# Patient Record
Sex: FEMALE | Race: WHITE | NOT HISPANIC OR LATINO | Employment: OTHER | URBAN - METROPOLITAN AREA
[De-identification: names, ages, dates, MRNs, and addresses within clinical notes are randomized per-mention and may not be internally consistent; named-entity substitution may affect disease eponyms.]

---

## 2018-05-02 ENCOUNTER — OFFICE VISIT (OUTPATIENT)
Dept: FAMILY MEDICINE CLINIC | Facility: CLINIC | Age: 23
End: 2018-05-02

## 2018-05-02 VITALS
SYSTOLIC BLOOD PRESSURE: 118 MMHG | DIASTOLIC BLOOD PRESSURE: 76 MMHG | HEIGHT: 63 IN | BODY MASS INDEX: 46.49 KG/M2 | RESPIRATION RATE: 16 BRPM | HEART RATE: 88 BPM | WEIGHT: 262.4 LBS | OXYGEN SATURATION: 98 % | TEMPERATURE: 98.2 F

## 2018-05-02 DIAGNOSIS — F41.9 ANXIETY: Primary | ICD-10-CM

## 2018-05-02 PROCEDURE — 99203 OFFICE O/P NEW LOW 30 MIN: CPT | Performed by: PHYSICIAN ASSISTANT

## 2018-05-02 RX ORDER — CITALOPRAM 20 MG/1
20 TABLET ORAL DAILY
Qty: 30 TABLET | Refills: 11 | Status: SHIPPED | OUTPATIENT
Start: 2018-05-02

## 2018-05-02 NOTE — PROGRESS NOTES
Subjective   Kayla Askew is a 23 y.o. female  Establish Care and Depression (Treated with Celexa 20mg in the past with good results)      History of Present Illness  Patient is a 23-year-old white female who is a new patient comes in for anxiety and depression, states she's been a lot of stress she's recently had a breakup which is causing a lot of depression and anxiety she denies any SI/HI but states that she's under a lot of stress.  She has used Celexa in the past which has worked well she's interested in going back on this medication.      The following portions of the patient's history were reviewed and updated as appropriate: allergies, current medications, past social history and problem list    Review of Systems   Constitutional: Negative for appetite change, diaphoresis, fatigue and unexpected weight change.   Eyes: Negative for visual disturbance.   Respiratory: Negative for cough, chest tightness and shortness of breath.    Cardiovascular: Negative for chest pain, palpitations and leg swelling.   Gastrointestinal: Negative for diarrhea, nausea and vomiting.   Endocrine: Negative for polydipsia, polyphagia and polyuria.   Skin: Negative for color change and rash.   Neurological: Negative for dizziness, syncope, weakness, light-headedness, numbness and headaches.       Objective     Vitals:    05/02/18 1037   BP: 118/76   Pulse: 88   Resp: 16   Temp: 98.2 °F (36.8 °C)   SpO2: 98%       Physical Exam   Constitutional: She appears well-developed and well-nourished.   Neck: Neck supple. No JVD present. No thyromegaly present.   Cardiovascular: Normal rate, regular rhythm, normal heart sounds, intact distal pulses and normal pulses.    No murmur heard.  Pulmonary/Chest: Effort normal and breath sounds normal. No respiratory distress.   Abdominal: Soft. Bowel sounds are normal. There is no hepatosplenomegaly. There is no tenderness.   Musculoskeletal: She exhibits no edema.   Lymphadenopathy:     She has no  cervical adenopathy.   Neurological: No sensory deficit.   Skin: Skin is warm and dry. She is not diaphoretic.   Nursing note and vitals reviewed.      Assessment/Plan     Diagnoses and all orders for this visit:    Anxiety  -     citalopram (CELEXA) 20 MG tablet; Take 1 tablet by mouth Daily.    Follow up in 2 weeks to recheck

## 2018-05-23 ENCOUNTER — OFFICE VISIT (OUTPATIENT)
Dept: FAMILY MEDICINE CLINIC | Facility: CLINIC | Age: 23
End: 2018-05-23

## 2018-05-23 VITALS
SYSTOLIC BLOOD PRESSURE: 112 MMHG | HEIGHT: 63 IN | BODY MASS INDEX: 47.59 KG/M2 | DIASTOLIC BLOOD PRESSURE: 76 MMHG | OXYGEN SATURATION: 98 % | HEART RATE: 67 BPM | WEIGHT: 268.6 LBS | RESPIRATION RATE: 14 BRPM

## 2018-05-23 DIAGNOSIS — F32.1 MODERATE SINGLE CURRENT EPISODE OF MAJOR DEPRESSIVE DISORDER (HCC): Primary | ICD-10-CM

## 2018-05-23 PROCEDURE — 99213 OFFICE O/P EST LOW 20 MIN: CPT | Performed by: PHYSICIAN ASSISTANT

## 2018-05-23 NOTE — PROGRESS NOTES
Subjective   Kayla Askew is a 23 y.o. female  Depression (Started citalopram 20mg three weeks ago and is working well, however it is causing her to feel drowsy and then unable to fall asleep at night)      History of Present Illness  Patient is a 22-year-old white female comes in follow-up of depression and anxiety meds working well she states she's much improved no problems or complaints for a pleased with current treatment plan she states she has no SI or HI improved 70-80 percent  The following portions of the patient's history were reviewed and updated as appropriate: allergies, current medications, past social history and problem list    Review of Systems   Constitutional: Negative for appetite change and fatigue.   Respiratory: Negative for chest tightness and shortness of breath.    Gastrointestinal: Negative for abdominal pain, diarrhea and nausea.   Neurological: Negative for dizziness, tremors, weakness, light-headedness and headaches.   Psychiatric/Behavioral: Positive for dysphoric mood and sleep disturbance. Negative for agitation, behavioral problems, confusion, decreased concentration and suicidal ideas. The patient is nervous/anxious.        Objective     Vitals:    05/23/18 1013   BP: 112/76   Pulse: 67   Resp: 14   SpO2: 98%       Physical Exam   Constitutional: She appears well-developed and well-nourished.   Neck: Neck supple. No JVD present. No thyromegaly present.   Cardiovascular: Normal rate, regular rhythm, normal heart sounds, intact distal pulses and normal pulses.    No murmur heard.  Pulmonary/Chest: Effort normal and breath sounds normal. No respiratory distress.   Abdominal: Soft. Bowel sounds are normal. There is no hepatosplenomegaly. There is no tenderness.   Musculoskeletal: She exhibits no edema.   Lymphadenopathy:     She has no cervical adenopathy.   Neurological: No sensory deficit.   Skin: Skin is warm and dry. She is not diaphoretic.   Nursing note and vitals  reviewed.      Assessment/Plan     Diagnoses and all orders for this visit:    Moderate single current episode of major depressive disorder    #1 continue Celexa 20 mg everyday recheck in 3-6 months    Follow-up as needed

## 2018-06-06 ENCOUNTER — OFFICE VISIT (OUTPATIENT)
Dept: FAMILY MEDICINE CLINIC | Facility: CLINIC | Age: 23
End: 2018-06-06

## 2018-06-06 VITALS
RESPIRATION RATE: 14 BRPM | TEMPERATURE: 97.9 F | HEIGHT: 63 IN | HEART RATE: 102 BPM | DIASTOLIC BLOOD PRESSURE: 70 MMHG | SYSTOLIC BLOOD PRESSURE: 110 MMHG | OXYGEN SATURATION: 98 %

## 2018-06-06 DIAGNOSIS — J01.00 ACUTE NON-RECURRENT MAXILLARY SINUSITIS: Primary | ICD-10-CM

## 2018-06-06 PROCEDURE — 99213 OFFICE O/P EST LOW 20 MIN: CPT | Performed by: PHYSICIAN ASSISTANT

## 2018-06-06 RX ORDER — CEFDINIR 300 MG/1
300 CAPSULE ORAL 2 TIMES DAILY
Qty: 20 CAPSULE | Refills: 0 | Status: SHIPPED | OUTPATIENT
Start: 2018-06-06

## 2018-06-06 RX ORDER — PREDNISONE 20 MG/1
20 TABLET ORAL 2 TIMES DAILY
Qty: 14 TABLET | Refills: 0 | Status: SHIPPED | OUTPATIENT
Start: 2018-06-06

## 2018-06-06 NOTE — PROGRESS NOTES
Subjective   Kayla Askew is a 23 y.o. female  Nasal Congestion (PND, runny nose, sore throat, dry cough x1 week)      Sinusitis   This is a new problem. Her pain is at a severity of 8/10. The pain is moderate. Associated symptoms include congestion, coughing, ear pain, headaches, sinus pressure, a sore throat and swollen glands. Pertinent negatives include no chills or shortness of breath. The treatment provided moderate relief.       The following portions of the patient's history were reviewed and updated as appropriate: allergies, current medications, past social history and problem list    Review of Systems   Constitutional: Negative for chills, fatigue and fever.   HENT: Positive for congestion, ear pain, postnasal drip, rhinorrhea, sinus pressure and sore throat.    Eyes: Positive for pain.   Respiratory: Positive for cough. Negative for shortness of breath.    Neurological: Positive for headaches. Negative for dizziness.   Hematological: Negative for adenopathy.       Objective     Vitals:    06/06/18 1443   BP: 110/70   Pulse: 102   Resp: 14   Temp: 97.9 °F (36.6 °C)   SpO2: 98%       Physical Exam   Constitutional: She appears well-developed and well-nourished.   HENT:   Head: Normocephalic and atraumatic.   Right Ear: Tympanic membrane and ear canal normal.   Left Ear: Tympanic membrane and ear canal normal.   Nose: Mucosal edema, rhinorrhea and sinus tenderness present. Right sinus exhibits maxillary sinus tenderness and frontal sinus tenderness. Left sinus exhibits maxillary sinus tenderness and frontal sinus tenderness.   Mouth/Throat: Oropharynx is clear and moist. No oropharyngeal exudate.   Eyes: Pupils are equal, round, and reactive to light.   Cardiovascular: Normal rate and regular rhythm.    Pulmonary/Chest: Effort normal and breath sounds normal.   Nursing note and vitals reviewed.      Assessment/Plan     Diagnoses and all orders for this visit:    Acute non-recurrent maxillary sinusitis  -      cefdinir (OMNICEF) 300 MG capsule; Take 1 capsule by mouth 2 (Two) Times a Day.  -     predniSONE (DELTASONE) 20 MG tablet; Take 1 tablet by mouth 2 (Two) Times a Day.    follow as needed

## 2023-05-08 PROCEDURE — 87205 SMEAR GRAM STAIN: CPT | Performed by: NURSE PRACTITIONER

## 2023-05-08 PROCEDURE — 87070 CULTURE OTHR SPECIMN AEROBIC: CPT | Performed by: NURSE PRACTITIONER

## 2023-05-08 PROCEDURE — 87186 SC STD MICRODIL/AGAR DIL: CPT | Performed by: NURSE PRACTITIONER

## 2023-05-08 PROCEDURE — 87077 CULTURE AEROBIC IDENTIFY: CPT | Performed by: NURSE PRACTITIONER

## 2023-05-11 ENCOUNTER — TELEPHONE (OUTPATIENT)
Dept: URGENT CARE | Facility: CLINIC | Age: 28
End: 2023-05-11
Payer: COMMERCIAL

## 2023-05-11 DIAGNOSIS — A49.8 PSEUDOMONAS INFECTION: Primary | ICD-10-CM

## 2023-05-11 RX ORDER — CEFDINIR 300 MG/1
300 CAPSULE ORAL 2 TIMES DAILY
Qty: 14 CAPSULE | Refills: 0 | Status: SHIPPED | OUTPATIENT
Start: 2023-05-11

## 2023-05-11 NOTE — TELEPHONE ENCOUNTER
Patient called back, results were given. She did ask if she could start her new medication today since she took Bactrim, I advised her to ask pharmacist if that would be okay just to make sure she didn't interact with each other. [facility]

## 2023-05-11 NOTE — TELEPHONE ENCOUNTER
Patient presented to PCP with abscess of scalp. Wound culture positive for pseudomonas. RX Bactrim had been given at day of visit. C&S reviewed, patient will be instructed to stop Bactrim and begin cefdinir.   Results and plan of care will be reviewed with patient via clinical staff.

## 2023-07-25 ENCOUNTER — TELEPHONE (OUTPATIENT)
Dept: URGENT CARE | Facility: CLINIC | Age: 28
End: 2023-07-25
Payer: COMMERCIAL

## 2024-02-27 ENCOUNTER — OFFICE VISIT (OUTPATIENT)
Dept: INTERNAL MEDICINE | Facility: CLINIC | Age: 29
End: 2024-02-27
Payer: COMMERCIAL

## 2024-02-27 VITALS
HEART RATE: 72 BPM | SYSTOLIC BLOOD PRESSURE: 100 MMHG | WEIGHT: 177 LBS | DIASTOLIC BLOOD PRESSURE: 70 MMHG | BODY MASS INDEX: 30.22 KG/M2 | RESPIRATION RATE: 18 BRPM | TEMPERATURE: 97.3 F | HEIGHT: 64 IN

## 2024-02-27 DIAGNOSIS — M25.572 ACUTE LEFT ANKLE PAIN: ICD-10-CM

## 2024-02-27 DIAGNOSIS — S92.255A CLOSED NONDISPLACED FRACTURE OF NAVICULAR BONE OF LEFT FOOT, INITIAL ENCOUNTER: Primary | ICD-10-CM

## 2024-02-27 DIAGNOSIS — S99.912S LEFT ANKLE INJURY, SEQUELA: ICD-10-CM

## 2024-02-27 DIAGNOSIS — J45.901 EXACERBATION OF ASTHMA, UNSPECIFIED ASTHMA SEVERITY, UNSPECIFIED WHETHER PERSISTENT: ICD-10-CM

## 2024-02-27 PROBLEM — J45.20 MILD INTERMITTENT ASTHMA WITHOUT COMPLICATION: Status: ACTIVE | Noted: 2024-02-27

## 2024-02-27 PROBLEM — E66.9 OBESITY (BMI 30.0-34.9): Status: ACTIVE | Noted: 2024-02-27

## 2024-02-27 PROBLEM — E66.811 OBESITY (BMI 30.0-34.9): Status: ACTIVE | Noted: 2024-02-27

## 2024-02-27 RX ORDER — ALBUTEROL SULFATE 90 UG/1
2 AEROSOL, METERED RESPIRATORY (INHALATION) EVERY 4 HOURS PRN
Qty: 18 G | Refills: 0 | Status: SHIPPED | OUTPATIENT
Start: 2024-02-27

## 2024-02-27 NOTE — PATIENT INSTRUCTIONS
MyPlate from USDA    MyPlate is an outline of a general healthy diet based on the Dietary Guidelines for Americans, 6927-4313, from the U.S. Department of Agriculture (USDA). It sets guidelines for how much food you should eat from each food group based on your age, sex, and level of physical activity.  What are tips for following MyPlate?  To follow MyPlate recommendations:  Eat a wide variety of fruits and vegetables, grains, and protein foods.  Serve smaller portions and eat less food throughout the day.  Limit portion sizes to avoid overeating.  Enjoy your food.  Get at least 150 minutes of exercise every week. This is about 30 minutes each day, 5 or more days per week.  It can be difficult to have every meal look like MyPlate. Think about MyPlate as eating guidelines for an entire day, rather than each individual meal.  Fruits and vegetables  Make one half of your plate fruits and vegetables.  Eat many different colors of fruits and vegetables each day.  For a 2,000-calorie daily food plan, eat:  2½ cups of vegetables every day.  2 cups of fruit every day.  1 cup is equal to:  1 cup raw or cooked vegetables.  1 cup raw fruit.  1 medium-sized orange, apple, or banana.  1 cup 100% fruit or vegetable juice.  2 cups raw leafy greens, such as lettuce, spinach, or kale.  ½ cup dried fruit.  Grains  One fourth of your plate should be grains.  Make at least half of the grains you eat each day whole grains.  For a 2,000-calorie daily food plan, eat 6 oz of grains every day.  1 oz is equal to:  1 slice bread.  1 cup cereal.  ½ cup cooked rice, cereal, or pasta.  Protein  One fourth of your plate should be protein.  Eat a wide variety of protein foods, including meat, poultry, fish, eggs, beans, nuts, and tofu.  For a 2,000-calorie daily food plan, eat 5½ oz of protein every day.  1 oz is equal to:  1 oz meat, poultry, or fish.  ¼ cup cooked beans.  1 egg.  ½ oz nuts or seeds.  1 Tbsp peanut butter.  Dairy  Drink fat-free  or low-fat (1%) milk.  Eat or drink dairy as a side to meals.  For a 2,000-calorie daily food plan, eat or drink 3 cups of dairy every day.  1 cup is equal to:  1 cup milk, yogurt, cottage cheese, or soy milk (soy beverage).  2 oz processed cheese.  1½ oz natural cheese.  Fats, oils, salt, and sugars  Only small amounts of oils are recommended.  Avoid foods that are high in calories and low in nutritional value (empty calories), like foods high in fat or added sugars.  Choose foods that are low in salt (sodium). Choose foods that have less than 140 milligrams (mg) of sodium per serving.  Drink water instead of sugary drinks. Drink enough fluid to keep your urine pale yellow.  Where to find support  Work with your health care provider or a dietitian to develop a customized eating plan that is right for you.  Download an verito (mobile application) to help you track your daily food intake.  Where to find more information  USDA: ChooseMyPlate.gov  Summary  MyPlate is a general guideline for healthy eating from the USDA. It is based on the Dietary Guidelines for Americans, 1914-7025.  In general, fruits and vegetables should take up one half of your plate, grains should take up one fourth of your plate, and protein should take up one fourth of your plate.  This information is not intended to replace advice given to you by your health care provider. Make sure you discuss any questions you have with your health care provider.  Document Revised: 11/08/2021 Document Reviewed: 11/08/2021  [employer] Patient Education © 2023 Else[employer] Inc.

## 2024-02-28 ENCOUNTER — PATIENT ROUNDING (BHMG ONLY) (OUTPATIENT)
Dept: INTERNAL MEDICINE | Facility: CLINIC | Age: 29
End: 2024-02-28
Payer: COMMERCIAL

## 2024-02-28 NOTE — PROGRESS NOTES
A Miso message has been sent to the patient for patient rounding with [facility].

## 2024-03-17 ENCOUNTER — HOSPITAL ENCOUNTER (OUTPATIENT)
Dept: MRI IMAGING | Facility: HOSPITAL | Age: 29
Discharge: HOME | End: 2024-03-17
Admitting: PHYSICIAN ASSISTANT
Payer: COMMERCIAL

## 2024-03-17 DIAGNOSIS — M25.572 ACUTE LEFT ANKLE PAIN: ICD-10-CM

## 2024-03-17 DIAGNOSIS — S99.912S LEFT ANKLE INJURY, SEQUELA: ICD-10-CM

## 2024-03-17 DIAGNOSIS — S92.255A CLOSED NONDISPLACED FRACTURE OF NAVICULAR BONE OF LEFT FOOT, INITIAL ENCOUNTER: ICD-10-CM

## 2024-03-17 PROCEDURE — 73721 MRI JNT OF LWR EXTRE W/O DYE: CPT

## 2024-03-19 ENCOUNTER — TELEPHONE (OUTPATIENT)
Dept: INTERNAL MEDICINE | Facility: CLINIC | Age: 29
End: 2024-03-19
Payer: COMMERCIAL

## 2024-03-19 NOTE — TELEPHONE ENCOUNTER
Tried to reach patient no answer left voicemail to return call    RELAY:  Please ask patient if she is currently seeing an orthopedic provider, I know there were a lot of issues with previous   There are no new fractures on MRI, there is evidence of old fracture and stress related changes as well as tendonitiis

## 2024-03-19 NOTE — TELEPHONE ENCOUNTER
----- Message from Kayla Mena PA-C sent at 3/19/2024 10:28 AM EDT -----  Please ask patient if she is currently seeing an orthopedic provider, I know there were a lot of issues with previous  There are no new fractures on MRI, there is evidence of old fracture and stress related changes as well as tendonitiis

## 2024-03-20 NOTE — TELEPHONE ENCOUNTER
2nd attempt    Tried to reach patient no answer left voicemail to return call     RELAY:  Please ask patient if she is currently seeing an orthopedic provider, I know there were a lot of issues with previous   There are no new fractures on MRI, there is evidence of old fracture and stress related changes as well as tendonitiis

## 2024-03-21 NOTE — TELEPHONE ENCOUNTER
Name: Kayla Askew    Relationship: Self    Best Callback Number: [phone]     HUB PROVIDED THE RELAY MESSAGE FROM THE OFFICE   PATIENT HAS FURTHER QUESTIONS AND WOULD LIKE A CALL BACK AT THE FOLLOWING PHONE NUMBER [phone]    ADDITIONAL INFORMATION: PATIENT WOULD LIKE TO RESCHEDULE HER APPOINTMENT TO A SOONEST AVAILABLE. ALSO STATES SHE DID SEE AN ORTHOPEDIC PROVIDER WHEN SHE FIRST INJURED HERSELF. THEY RELEASED HER BACK TO WORK STATING SHE WAS JUST SEVERELY OVER WEIGHT.     ANOTHER  PROVIDER RELEASED HER BACK TO WORK WITH REDUCED RESTRICTIONS STATING TO REST FOR 30 MINUTES FOR EVERY HOUR WORKING BUT HER JOB COULD NOT ACCEPT THOSE RESTRICTIONS. PATIENT HAS BEEN OFF WORK SINCE DECEMBER WHEN THE INJURY FIRST HAPPENED.   RETURNED TO WORK FOR ONE WEEK AND THEN SAW THE SECOND ORTHO PROVIDER AND HAS BEEN OFF SINCE.   PATIENT IS IN A LOT OF PAIN EVEN THOUGH SHE IS NOT USING IT OVER THE PAST FEW DAYS. WHEN MOVING WITH EVERY MOVEMENT IT FEELS LIKE HER ANKLE IS BREAKING.    WANTS TO NOTE THAT SHE DID HAVE A FOLLOW UP APPOINTMENT WITH DR NAVARRO WHOM SHE HAD THE BAD EXPERIENCE WITH) BUT GOT SICK AND HAD TO RESCHEDULE- STILL WAITING FOR THEM TO CALL TO RESCHEDULE    PLEASE CALL TO ADVISE IF ABLE TO WORK IN SOONER.

## 2024-03-22 ENCOUNTER — TELEPHONE (OUTPATIENT)
Dept: INTERNAL MEDICINE | Facility: CLINIC | Age: 29
End: 2024-03-22
Payer: COMMERCIAL

## 2024-03-22 DIAGNOSIS — S92.255A CLOSED NONDISPLACED FRACTURE OF NAVICULAR BONE OF LEFT FOOT, INITIAL ENCOUNTER: Primary | ICD-10-CM

## 2024-03-22 NOTE — TELEPHONE ENCOUNTER
Name: Kayla Askew    Relationship: Self    Best Callback Number: [phone]    HUB PROVIDED THE RELAY MESSAGE FROM THE OFFICE   PATIENT VOICED UNDERSTANDING AND HAS NO FURTHER QUESTIONS AT THIS TIME    ADDITIONAL INFORMATION: IS REQUESTING A REFERRAL FOR A ORTHO

## 2024-03-22 NOTE — TELEPHONE ENCOUNTER
There are two messages regarding this.    Patient has called the clinic back and relayed to the hub that she would like a referral. Kayla has sent one in as requested.     I have left a detailed message with return phone number advising patient that a referral has been sent in as requested.     Relay:  Your referral has been sent, thank you

## 2024-03-22 NOTE — TELEPHONE ENCOUNTER
I have called and left a detailed message with return phone number    Relay:  Is she ok with me sending a referral to ortho for her  or does she want to stay with the ortho she has

## 2024-03-22 NOTE — TELEPHONE ENCOUNTER
Is she ok with me sending a referral to ortho for her  or does she want to stay with the ortho she has

## 2024-03-22 NOTE — TELEPHONE ENCOUNTER
Duplicate message.     I have left a detailed message with return phone number advising that her referral has been sent as requested.     Relay:  Referral has been sent, thank you

## 2024-03-27 ENCOUNTER — TELEPHONE (OUTPATIENT)
Dept: INTERNAL MEDICINE | Facility: CLINIC | Age: 29
End: 2024-03-27
Payer: COMMERCIAL

## 2024-03-27 NOTE — TELEPHONE ENCOUNTER
Name: JamilahKayla luna    Relationship: Self    Best Callback Number: [phone]    HUB PROVIDED THE RELAY MESSAGE FROM THE OFFICE   PATIENT VOICED UNDERSTANDING AND HAS NO FURTHER QUESTIONS AT THIS TIME    ADDITIONAL INFORMATION:

## 2024-03-27 NOTE — TELEPHONE ENCOUNTER
I have called and left a message with return phone number      Relay:  I have checked with the clinic supervisor, Vaishali, and the patient would need to have the FMLA/ work restriction paperwork filled out by ortho or whoever took her off of work due to restrictions. She can let the 2nd office that she has seen know that it is a HIPAA violation to block her continuation of care by not providing office notes to the new provider. She can request for whoever fills out her paperwork to take her off of work until she has seen the new ortho provider as well. Please let me know if she has any additional questions.

## 2024-04-02 ENCOUNTER — OFFICE VISIT (OUTPATIENT)
Dept: INTERNAL MEDICINE | Facility: CLINIC | Age: 29
End: 2024-04-02
Payer: COMMERCIAL

## 2024-04-02 ENCOUNTER — TELEPHONE (OUTPATIENT)
Dept: INTERNAL MEDICINE | Facility: CLINIC | Age: 29
End: 2024-04-02

## 2024-04-02 VITALS
TEMPERATURE: 97.7 F | WEIGHT: 189.13 LBS | BODY MASS INDEX: 32.29 KG/M2 | RESPIRATION RATE: 16 BRPM | HEIGHT: 64 IN | SYSTOLIC BLOOD PRESSURE: 108 MMHG | DIASTOLIC BLOOD PRESSURE: 72 MMHG | HEART RATE: 60 BPM

## 2024-04-02 DIAGNOSIS — M65.9 TENOSYNOVITIS OF ANKLE: ICD-10-CM

## 2024-04-02 DIAGNOSIS — Z00.00 ANNUAL PHYSICAL EXAM: Primary | ICD-10-CM

## 2024-04-02 PROBLEM — M65.979 TENOSYNOVITIS OF ANKLE: Status: ACTIVE | Noted: 2024-04-02

## 2024-04-02 RX ORDER — PREDNISONE 10 MG/1
TABLET ORAL
Qty: 9 TABLET | Refills: 0 | Status: SHIPPED | OUTPATIENT
Start: 2024-04-02 | End: 2024-04-08

## 2024-04-02 NOTE — TELEPHONE ENCOUNTER
[facility] called and states they mailed patients requested medical records on 3/6/2024. She states they continue to get fax request for patient.

## 2024-04-02 NOTE — PROGRESS NOTES
Office Note     Name: Kayla Askew    : 1995     MRN: [id number]     Chief Complaint  Annual Exam    Subjective     History of Present Illness:  Kayla Askew is a 29 y.o. female who presents today for annual physical.    Patient is still experiencing left ankle pain. Patient reports new ortho will not accept appointment until old records are faxed. Will sign KINA today    From 2024:  Patient reports having a diffuse left ankle pain which started a couple of weeks before [place]. It is not localized medially or laterally. She reports that she was originally seen by [facility]. At that time, she was told that she has a navicular fracture. She was then seen by [facility] and was informed to have a navicular fracture and was placed in a walking boot. She had an unpleasant experience with this practice. She saw [facility] most recently on 2024. They have her in an ankle brace, however, that seems to be making the pain worse. She is using Motrin as needed without any relief.      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 Associated mild productive changes are noted. These findings may account for evidence of apparent fracture on   prior radiographs.  2.Otherwise there is no evidence for fracture or malalignment. No osseous contusion is seen.  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  4.Mild changes of tendinopathy and tenosynovitis involving the peroneus longus and brevis tendons.    Review of Systems:   Review of Systems    Past Medical History:   Past Medical History:   Diagnosis Date    Asthma     Depression     Low back pain     Menses painful        Past Surgical History: History reviewed. No pertinent surgical  "history.    Immunizations:   Immunization History   Administered Date(s) Administered    DTaP, Unspecified 05/18/2000    IPV 05/18/2000    MMR 05/18/2000        Medications:     Current Outpatient Medications:     albuterol sulfate  (90 Base) MCG/ACT inhaler, Inhale 2 puffs Every 4 (Four) Hours As Needed for Wheezing or Shortness of Air., Disp: 18 g, Rfl: 0    predniSONE (DELTASONE) 10 MG tablet, Take 1 tablet by mouth 2 (Two) Times a Day for 3 days, THEN 1 tablet Daily for 3 days., Disp: 9 tablet, Rfl: 0    Allergies:   No Known Allergies    Family History:   Family History   Problem Relation Age of Onset    Thyroid disease Mother     Migraines Mother     Hypertension Father     Hyperlipidemia Father     Diabetes Father     Arthritis Maternal Aunt     Arthritis Paternal Aunt     Arthritis Maternal Grandmother     Arthritis Maternal Grandfather     Arthritis Paternal Grandmother     Arthritis Paternal Grandfather        Social History:   Social History     Socioeconomic History    Marital status: Single   Tobacco Use    Smoking status: Never     Passive exposure: Never    Smokeless tobacco: Never   Vaping Use    Vaping status: Never Used   Substance and Sexual Activity    Alcohol use: Yes     Alcohol/week: 8.0 standard drinks of alcohol     Types: 3 Shots of liquor, 5 Drinks containing 0.5 oz of alcohol per week     Comment: Socially    Drug use: No    Sexual activity: Yes     Partners: Male         Objective     Vital Signs  /72 (BP Location: Left arm, Patient Position: Sitting, Cuff Size: Adult)   Pulse 60   Temp 97.7 °F (36.5 °C) (Temporal)   Resp 16   Ht 161.5 cm (63.58\")   Wt 85.8 kg (189 lb 2 oz)   BMI 32.89 kg/m²   Estimated body mass index is 32.89 kg/m² as calculated from the following:    Height as of this encounter: 161.5 cm (63.58\").    Weight as of this encounter: 85.8 kg (189 lb 2 oz).            Physical Exam  Vitals and nursing note reviewed.   Constitutional:       Appearance: " Normal appearance.   Cardiovascular:      Rate and Rhythm: Normal rate and regular rhythm.      Pulses: Normal pulses.      Heart sounds: Normal heart sounds.   Pulmonary:      Effort: Pulmonary effort is normal.      Breath sounds: Normal breath sounds.   Genitourinary:     Comments: Deferred patient currently menstruating  Musculoskeletal:      Right ankle: Normal.      Left ankle: No swelling, deformity or ecchymosis. Tenderness present over the ATF ligament. Decreased range of motion.   Skin:     General: Skin is warm and dry.   Neurological:      General: No focal deficit present.      Mental Status: She is alert.   Psychiatric:         Mood and Affect: Mood normal.         Behavior: Behavior normal.          Assessment and Plan     1. Annual physical exam    - Comprehensive Metabolic Panel; Future  - CBC & Differential; Future  - Lipid Panel; Future  - TSH Rfx On Abnormal To Free T4; Future    2. Tenosynovitis of ankle    - predniSONE (DELTASONE) 10 MG tablet; Take 1 tablet by mouth 2 (Two) Times a Day for 3 days, THEN 1 tablet Daily for 3 days.  Dispense: 9 tablet; Refill: 0     Reviewed medications, potential side effects and signs and symptoms to report. Discussed risk versus benefits of treatment plan with patient and/or family-including medications, labs and radiology that may be ordered. Addressed questions and concerns during visit. Patient and/or family verbalized understanding and agree with plan. Instructed to call the office with any questions and report to ER with any life-threatening symptoms.       Patient does use a seatbelt, use sunscreen, helmet when necessary, smoke detectors and carbon monoxide detectors are in home, safe sexual practices and does not participate in illicit drug use.    Recommended annual Dental, Eye and Dermatology exam.  Explained to patient no referral should be required however I am happy to help if one is needed      Follow Up  Return in about 4 weeks (around 4/30/2024)  jordi Monreal.    WILFREDO Quintana [facility]  [address] [address] [phone]

## 2024-04-03 ENCOUNTER — TELEPHONE (OUTPATIENT)
Dept: INTERNAL MEDICINE | Facility: CLINIC | Age: 29
End: 2024-04-03
Payer: COMMERCIAL

## 2024-04-03 NOTE — TELEPHONE ENCOUNTER
Patient's mother dropped off forms that need to be completed. I told patient's mom that patient would need to pay a 25 form fee before forms could be released. Forms placed in [employer] box up front.

## 2024-04-04 NOTE — TELEPHONE ENCOUNTER
Kayla states that she was able to get an appointment tomorrow with ortho and is wanting them to assess and fill out her paperwork at this time. She will contact the clinic if anything changes.     Forms are placed in the front office for

## 2024-04-04 NOTE — TELEPHONE ENCOUNTER
I have called and left a message with return phone number to complete patient's work restriction form. Please transfer call to Dorie to complete form. Form is on Dorie's desk until completed.

## 2024-04-05 ENCOUNTER — OFFICE VISIT (OUTPATIENT)
Dept: ORTHOPEDIC SURGERY | Facility: CLINIC | Age: 29
End: 2024-04-05
Payer: COMMERCIAL

## 2024-04-05 ENCOUNTER — TELEPHONE (OUTPATIENT)
Dept: ORTHOPEDIC SURGERY | Facility: CLINIC | Age: 29
End: 2024-04-05

## 2024-04-05 VITALS
WEIGHT: 180.4 LBS | DIASTOLIC BLOOD PRESSURE: 72 MMHG | HEIGHT: 64 IN | BODY MASS INDEX: 30.8 KG/M2 | SYSTOLIC BLOOD PRESSURE: 110 MMHG

## 2024-04-05 DIAGNOSIS — M79.672 LEFT FOOT PAIN: Primary | ICD-10-CM

## 2024-04-05 RX ORDER — MELOXICAM 15 MG/1
TABLET ORAL
Qty: 30 TABLET | Refills: 1 | Status: SHIPPED | OUTPATIENT
Start: 2024-04-05

## 2024-04-05 NOTE — TELEPHONE ENCOUNTER
Stormy ok to read:   Called patient to schedule f/u with Dr. Espinoza. Also let her know that we sent her work note to her mychart.

## 2024-04-05 NOTE — PROGRESS NOTES
[facility] Orthopaedic Surgery Office Follow Up       Office Follow Up Visit       Patient Name: Kayla Askew    Chief Complaint:   Chief Complaint   Patient presents with    Left Foot - Pain     DOI: 12/16/23       Referring Physician: Kayla Mena P*    History of Present Illness:   Kayla Askew returns to clinic today for new problem of left foot pain.  Ongoing since 12/16/2023.  She does not recall significant injury or trauma.  She felt she may have stepped awkwardly at onset of pain.  Rates pain 8/10.  Described as aching, stabbing, shooting pain.  Associated with popping.    She went to [facility] urgent treatment center on 12/21/2023 for evaluation due to the pain.  She was told she had a fracture.  She was encouraged to wear a boot for 6 weeks.  She then saw Dr. Gonzalez at [facility] orthopedics on 1/5/2024 for evaluation.  He said it appeared the fracture was healing and to start weaning out of the boot.  Recommended returning to work once comfortable.    She says she returned to work in February however the pain quickly worsened.  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  She has been off work since then due to the pain.    Her primary care provider ordered an MRI of the left foot which was obtained on 3/17/2024.  Here for further evaluation.    History of plantar fasciitis.    Subjective     Review of Systems   Constitutional: Negative.  Negative for chills, fatigue and fever.   HENT: Negative.  Negative for congestion and dental problem.    Eyes: Negative.  Negative for blurred vision.   Respiratory: Negative.  Negative for shortness of breath.    Cardiovascular: Negative.  Negative for leg swelling.   Gastrointestinal: Negative.  Negative for abdominal pain.   Endocrine: Negative.  Negative for polyuria.   Genitourinary: Negative.  Negative for difficulty urinating.   Musculoskeletal:  Positive for  "arthralgias.   Skin: Negative.    Allergic/Immunologic: Negative.    Neurological: Negative.    Hematological: Negative.  Negative for adenopathy.   Psychiatric/Behavioral: Negative.  Negative for behavioral problems.         I have reviewed and updated the following portions of the patient's history and review of systems: allergies, current medications, past family history, past medical history, past social history, past surgical history and problem list.    Medications:   Current Outpatient Medications:     albuterol sulfate  (90 Base) MCG/ACT inhaler, Inhale 2 puffs Every 4 (Four) Hours As Needed for Wheezing or Shortness of Air., Disp: 18 g, Rfl: 0    predniSONE (DELTASONE) 10 MG tablet, Take 1 tablet by mouth 2 (Two) Times a Day for 3 days, THEN 1 tablet Daily for 3 days., Disp: 9 tablet, Rfl: 0    meloxicam (MOBIC) 15 MG tablet, 1 PO Daily with food.  Stop if you have upset stomach/stomach irritation., Disp: 30 tablet, Rfl: 1    Allergies: No Known Allergies      Objective      Vital Signs:   Vitals:    04/05/24 0852   BP: 110/72   Weight: 81.8 kg (180 lb 6.4 oz)   Height: 161.3 cm (63.5\")       Ortho Exam:  Peripheral Vascular:  Lower Extremity:  Inspection:  Left--rapid capillary refill  Palpation:  Dorsalis pedis pulse:  Left--normal    Neurologic  Sensory:    Light Touch:     Left foot:  Dorsal intact and plantar intact    Overall Assessment of Muscle Strength and Tone:  Lower Extremities:       Left:  Tibialis anterior--5/5    Gastroc soleus--5/5    EHL--5/5    FHL--5/5    Musculoskeletal  Lower Extremity  Ankle/Foot:  Inspection and Palpation:        Left:  Tenderness:midfoot around navicular bone    Swelling:None    Effusion:  None    Crepitus:  None     ROM:     Left: Plantarflexion--50    Dorsiflexion--20    Inversion--10    Eversion--10    Results Review:  XR Foot 3+ View Left  Left Foot X-Ray 04/05/24   Indication: Pain  Views: 3 weight bearing , comparison none  Findings: xrays reviewed by " me today in the office and show no acute   osseous abnormality, normal alignment, there appears to be large   osteophyte at the dorsal navicular bone soft tissue swelling in the foot   visible on lateral view       MRI Ankle Left Without Contrast    Result Date: 3/19/2024  1.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 Associated mild productive changes are noted. These findings may account for evidence of apparent fracture on prior radiographs. 2.Otherwise there is no evidence for fracture or malalignment. No osseous contusion is seen. 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 4.Mild changes of tendinopathy and tenosynovitis involving the peroneus longus and brevis tendons. Electronically Signed: Chris Small MD  3/19/2024 10:17 AM EDT  Workstation ID: [id number]        Assessment / Plan      Assessment:   Diagnoses and all orders for this visit:    1. Left foot pain (Primary)  -     XR Foot 3+ View Left  -     meloxicam (MOBIC) 15 MG tablet; 1 PO Daily with food.  Stop if you have upset stomach/stomach irritation.  Dispense: 30 tablet; Refill: 1        Quality Metrics:   BMI:   BMI is >= 30 and <35. (Class 1 Obesity). The following options were offered after discussion;: weight loss educational material (shared in after visit summary)       Tobacco:   Kayla Askew  reports that she has never smoked. She has never been exposed to tobacco smoke. She has never used smokeless tobacco.        Plan:  X-ray images left foot reviewed today with Dr. Espinoza.  No acute bony changes.  Large osteophyte present at dorsal aspect of navicular bone.  MRI images left foot personally reviewed and interpreted.  Large osteophyte off navicular bone present which may represent accessory ossification rather than apparent fracture  identified on previous x-rays.  Appears to have edema surrounding.  Encouraged an anti-inflammatory to take for the pain and swelling.  Prescription sent in for meloxicam.  Recommend follow-up with Dr. Espinoza for corticosteroid injection.  Recommend work restrictions for the next 2 weeks to include 1 hour of sitting for every 4 hours worked. This will allow steroid injection and anti-inflammatory to start working prior to returning full-duty.      Follow Up:   Follow-up with Dr. Olga Stokes PA-C  [facility] Orthopedic Surgery    Dictated using Dragon Speech Recognition.

## 2024-04-10 ENCOUNTER — OFFICE VISIT (OUTPATIENT)
Dept: ORTHOPEDIC SURGERY | Facility: CLINIC | Age: 29
End: 2024-04-10
Payer: COMMERCIAL

## 2024-04-10 VITALS
DIASTOLIC BLOOD PRESSURE: 84 MMHG | HEIGHT: 64 IN | BODY MASS INDEX: 30.73 KG/M2 | SYSTOLIC BLOOD PRESSURE: 138 MMHG | WEIGHT: 180 LBS

## 2024-04-10 DIAGNOSIS — M79.672 LEFT FOOT PAIN: Primary | ICD-10-CM

## 2024-04-10 RX ORDER — ROPIVACAINE HYDROCHLORIDE 5 MG/ML
1 INJECTION, SOLUTION EPIDURAL; INFILTRATION; PERINEURAL
Status: COMPLETED | OUTPATIENT
Start: 2024-04-10 | End: 2024-04-10

## 2024-04-10 RX ORDER — TRIAMCINOLONE ACETONIDE 40 MG/ML
40 INJECTION, SUSPENSION INTRA-ARTICULAR; INTRAMUSCULAR
Status: COMPLETED | OUTPATIENT
Start: 2024-04-10 | End: 2024-04-10

## 2024-04-10 RX ORDER — PREDNISONE 10 MG/1
10 TABLET ORAL DAILY
COMMUNITY

## 2024-04-10 RX ADMIN — ROPIVACAINE HYDROCHLORIDE 1 ML: 5 INJECTION, SOLUTION EPIDURAL; INFILTRATION; PERINEURAL at 09:32

## 2024-04-10 RX ADMIN — TRIAMCINOLONE ACETONIDE 40 MG: 40 INJECTION, SUSPENSION INTRA-ARTICULAR; INTRAMUSCULAR at 09:32

## 2024-04-10 NOTE — PATIENT INSTRUCTIONS
This topical gel also contains the active ingredient Diclofenac (an NSAID) which helps to reduce inflammation caused by arthritis or other chronic conditions  You can apply this gel directly to the skin up to four times per day over the area that is most painful.     [employer]                  Online:  [email or web address]  [email or web address]  [email or web address]    [place], [place]:   [facility] [place]   [address], [facility] [address]   [email or web address]      [employer]   [address], [facility], [address]   [email or web address]      [facility]   [address] [place] [address]   [email or web address]      [employer] [facility]   [address] , [address], [place] [address]   [email or web address]     [employer] [facility]  [address], [place] [address]  [email or web address]  P: [phone]

## 2024-04-10 NOTE — PROGRESS NOTES
"                          [facility] Orth[facility] Office Follow Up     Office Follow Up Visit     Date: 04/10/2024   Patient Name: Kayla Askew  MRN: [id number]  YOB: 1995  Chief Complaint:   Chief Complaint   Patient presents with    Follow-up     1 week follow up -- Left foot pain (DOI: 12/16/23)     History of Present Illness:   Kayla Askew is a 29 y.o. female who is here today for follow up for for left foot/ankle pain.  Patient states had an injury where she stepped awkwardly off a curb in December.  Treated as a sprain.  Subsequently was in a boot followed by a brace and did physical therapy.  States pain did improve however pain has plateaued.  Did return to work and had so much pain that she had to stop working out several weeks ago.  Seen in our clinic previously.  States pain is worse with increased activity.  Pain also worse with stairs or with terminal ankle dorsiflexion.    Subjective   I reviewed the patient's chief complaint, history of present illness, review of systems, past medical history, surgical history, family history, social history, medications and allergy list   Objective    Vital Signs:   Vitals:    04/10/24 0851   BP: 138/84   Weight: 81.6 kg (180 lb)   Height: 161.3 cm (63.5\")     Body mass index is 31.39 kg/m².    Ortho Exam:  left LE Foot and Ankle Exam:   Plantigrade foot.   There is good perfusion to the toes.   The skin is intact throughout the foot and ankle.  Range of motion of ankle, foot and toes is within normal limits.   There is tenderness to palpation directly over prominent bone palpable at the dorsal navicular, also tender to the soft tissues surrounding the area.      Results Review:  US Guided Injection  Ultrasound-guided corticosteroid injection of the left talonavicular joint     MRI ANKLE LEFT  WO CONTRAST     Date of Exam: 3/17/2024 2:02 PM EDT     Indication: diffuse left ankle pain, not improved with conservative  treatment, navicular " fracture of xray, failed PT and treatment with brace.     Comparison: None available.     Technique:  Routine multiplanar/multisequence images of the left ankle were obtained without contrast administration.        FINDINGS:  The posterior tibialis, flexor digitorum longus, and flexor hallucis longus tendons are intact. Mild tendinopathy and tenosynovitis are noted involving peroneus longus and brevis tendons. There is no tendon tear or retraction. The anterior extensor   tendons of the ankle are intact. The distal Achilles tendon attachment is intact. The proximal attachments of the plantar fascia are intact.     The deltoid ligament complex and spring ligament are intact. The anterior talofibular ligament, calcaneal fibular ligament, and posterior talofibular ligament are intact. The anterior and posterior tibiofibular ligaments are intact.     There is no joint effusion. There is no evidence for osteochondral injury of the talar dome. The articulations of the ankle, hindfoot, and midfoot are intact. There is evidence for partially fused accessory ossification center along the dorsal aspect of   the calcaneal navicular articulation and dorsal aspect of the navicular bone. Mild associated productive changes are seen. The ossification center measures approximately 9 mm. These findings may contribute to the appearance of potential fracture in this   region on prior radiographs. Otherwise no abnormal bone marrow signal is seen. There is no evidence for definitive fracture throughout the bones of the ankle, hindfoot, or midfoot. The cortical margins are grossly intact.     There is irregularity at the calcaneal navicular articulation with increased signal or edema. The findings suggest changes of partial fibrous coalition with mild associated stress related changes.      No abnormal focal fluid collections are seen within the surrounding soft tissues.     IMPRESSION:  1.Evidence for a partially fused accessory  ossification center along the dorsal aspect of the navicular bone and talonavicular articulation. Associated mild productive changes are noted. These findings may account for evidence of apparent fracture on   prior radiographs.  2.Otherwise there is no evidence for fracture or malalignment. No osseous contusion is seen.  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  4.Mild changes of tendinopathy and tenosynovitis involving the peroneus longus and brevis tendons.    04/10/24 I have personally reviewed and interpreted the images from outside facility with the documented findings, bony prominence at dorsum talonavicular joint with some surrounding increase in signal    Assessment / Plan    Assessment/Plan:   Diagnoses and all orders for this visit:    1. Left foot pain (Primary)  -     US Guided Injection    Other orders  -     - Medium Joint Arthrocentesis      Discussed acute left foot/ankle pain/injury (undiagnosed new problem with uncertain prognosis) with patient as well as treatment options at length. Decision regarding surgical intervention considered. Patient is not a candidate due to trial of nonoperative management.  Patient suffered ankle injury in December and seems to subsequently developed prominent bone at likely an area from a previous avulsion on the dorsal navicular.  Patient is directly tender over this area as well as painful in the soft tissues adjacent.  This is likely causing some focal tendinitis due to its prominence.  It also seems to be causing some symptoms of impingement which make it worse with ambulation or activity.  Discussed with patient conservative treatment options including brace, boot, over-the-counter pain medication, and/or possible injection.  Provided patient with information on Voltaren gel.  Patient elected for an injection to the talonavicular joint today.  The  plan will be for the patient to follow-up in 3 months.  I told her if the injection helps we could continue the injection but if not we could further talk about surgical removal of the prominence which I think would likely help with her symptoms.  Plan to see patient back in 3 months for reevaluation.    I discussed with the patient the potential benefits of performing a therapeutic injections as well as potential risks including but not limited to infection, swelling, pain, bleeding, bruising, nerve/vessel damage, skin color changes, transient elevation in blood glucose levels, and fat atrophy. After informed consent  a steroid injection was given, see separate procedure note.     Follow Up:   Return in about 3 months (around 7/10/2024) for Recheck, F/U with Images.      Robi Espinoza MD  [facility]opedic Surgeon

## 2024-04-10 NOTE — PROGRESS NOTES
Procedure   - Medium Joint Arthrocentesis (Left foot Talonavicular ) on 4/10/2024 9:32 AM  Indications: pain  Details: (23g) needle, ultrasound-guided medial approach  Medications: 1 mL ropivacaine 0.5 %; 40 mg triamcinolone acetonide 40 MG/ML  Outcome: tolerated well, no immediate complications  Procedure, treatment alternatives, risks and benefits explained, specific risks discussed. Consent was given by the patient. Immediately prior to procedure a time out was called to verify the correct patient, procedure, equipment, support staff and site/side marked as required. Patient was prepped and draped in the usual sterile fashion.

## 2024-04-16 ENCOUNTER — TELEPHONE (OUTPATIENT)
Dept: INTERNAL MEDICINE | Facility: CLINIC | Age: 29
End: 2024-04-16
Payer: COMMERCIAL

## 2024-04-16 NOTE — TELEPHONE ENCOUNTER
I have called and left a message with return phone number    Relay:  We have received paperwork to be completed again for your work restrictions. Previously we have left them for you to  as you were going to have them filled out at your ortho appointment. Were you able to have them completed by ortho?

## 2024-04-17 ENCOUNTER — TELEPHONE (OUTPATIENT)
Dept: ORTHOPEDIC SURGERY | Facility: CLINIC | Age: 29
End: 2024-04-17
Payer: COMMERCIAL

## 2024-04-18 NOTE — TELEPHONE ENCOUNTER
PATIENT CALLING BACK ABOUT THIS SHE STATES THAT THE RESTRICTIONS WERE INCORRECT, STATES THAT HER RESTRICTIONS ARE ONLY GOOD TIL TOMORROW AND SHE DOESN'T GO BACK TO WORK YET

## 2024-04-19 ENCOUNTER — TELEPHONE (OUTPATIENT)
Dept: ORTHOPEDICS | Facility: OTHER | Age: 29
End: 2024-04-19
Payer: COMMERCIAL

## 2024-04-19 NOTE — TELEPHONE ENCOUNTER
Caller: Kayla Askew    Relationship: Self    Best call back number:PATRICIA #-  [phone]  - MOM- [phone]-    What form or medical record are you requesting: RETURN TO WORK NOTE- WITH RESTRICTIONS    Who is requesting this form or medical record from you: EMPLOYER-     How would you like to receive the form or medical records (pick-up, mail, fax):PICKUP  OR ADD TO [employer]      Timeframe paperwork needed: ASAP    Additional notes: PT WILL BE RETURNING TO WORK ON 4/22 AND NEEDS THE RETURN TO WORK NOTE WITH RESTRICTIONS TO START FROM 4-22-24 - AND UP TO 2 WEEKS. PT WOULD LIKE THE RESTRICTIONS TO HAVE AN HOUR IN THE MIDDLE OF THE SHIFT AND REST AS NEEDED.

## 2024-05-06 ENCOUNTER — OFFICE VISIT (OUTPATIENT)
Dept: INTERNAL MEDICINE | Facility: CLINIC | Age: 29
End: 2024-05-06
Payer: COMMERCIAL

## 2024-05-06 VITALS
HEART RATE: 68 BPM | WEIGHT: 190 LBS | BODY MASS INDEX: 33.13 KG/M2 | RESPIRATION RATE: 20 BRPM | DIASTOLIC BLOOD PRESSURE: 60 MMHG | SYSTOLIC BLOOD PRESSURE: 102 MMHG | TEMPERATURE: 97.3 F

## 2024-05-06 DIAGNOSIS — Z12.4 SCREENING FOR CERVICAL CANCER: Primary | ICD-10-CM

## 2024-05-06 PROCEDURE — 99213 OFFICE O/P EST LOW 20 MIN: CPT | Performed by: PHYSICIAN ASSISTANT

## 2024-05-06 RX ORDER — IBUPROFEN 200 MG
600 TABLET ORAL EVERY 8 HOURS PRN
COMMUNITY
Start: 2024-01-05

## 2024-05-08 ENCOUNTER — TELEPHONE (OUTPATIENT)
Dept: ORTHOPEDIC SURGERY | Facility: CLINIC | Age: 29
End: 2024-05-08
Payer: COMMERCIAL

## 2024-05-10 ENCOUNTER — OFFICE VISIT (OUTPATIENT)
Dept: ORTHOPEDIC SURGERY | Facility: CLINIC | Age: 29
End: 2024-05-10
Payer: COMMERCIAL

## 2024-05-10 VITALS
HEIGHT: 64 IN | SYSTOLIC BLOOD PRESSURE: 108 MMHG | DIASTOLIC BLOOD PRESSURE: 70 MMHG | BODY MASS INDEX: 31.24 KG/M2 | WEIGHT: 183 LBS

## 2024-05-10 DIAGNOSIS — M79.672 LEFT FOOT PAIN: ICD-10-CM

## 2024-05-10 DIAGNOSIS — M25.572 ACUTE LEFT ANKLE PAIN: Primary | ICD-10-CM

## 2024-05-13 LAB
CONV .: ABNORMAL
CYTOLOGIST CVX/VAG CYTO: ABNORMAL
CYTOLOGY CVX/VAG DOC CYTO: ABNORMAL
CYTOLOGY CVX/VAG DOC THIN PREP: ABNORMAL
DX ICD CODE: ABNORMAL
DX ICD CODE: ABNORMAL
HPV I/H RISK 4 DNA CVX QL PROBE+SIG AMP: NEGATIVE
Lab: ABNORMAL
OTHER STN SPEC: ABNORMAL
PATHOLOGIST CVX/VAG CYTO: ABNORMAL
STAT OF ADQ CVX/VAG CYTO-IMP: ABNORMAL

## 2024-06-17 ENCOUNTER — TELEPHONE (OUTPATIENT)
Dept: ORTHOPEDIC SURGERY | Facility: CLINIC | Age: 29
End: 2024-06-17
Payer: COMMERCIAL

## 2024-06-17 NOTE — TELEPHONE ENCOUNTER
Caller: LES    Relationship: SELF    Best call back number: [phone]    What is the best time to reach you: ANY    Who are you requesting to speak with (clinical staff, provider,  specific staff member): CLINICAL    What was the call regarding: HAS APPT ON 6/21/24 TO EVALUATE CUSTOM BRACE BUT SHE IS GETTING THE BRACE ON 6/18/24. IS THAT ENOUGH TIME TO EVALUATE OR DOES SHE NEED TO RESCHEDULE    Is it okay if the provider responds through MyChart: CALL    
Dr. Espinoza-please see message below and advise follow up for pt; If agreeable to extending her follow up, are you agreeable to providing pt a work note to extend her off work restrictions?    Spoke to pt who states the plan was for her to be out of work until she was able to transition out of her boot and into an AFO; Since she isn't getting her brace until tomorrow and her follow up with Dr. Espinoza is on Friday, she was wondering if she should push out her follow up to allow more time to wear the brace to see if she thinks she can return to work in the brace. She does have a follow up scheduled for 7/10/24 also and is wondering if she should just keep that appt instead. I told her I would send message to Dr. Espinoza to see what he recommended. She understood.    Dago ARRIOLA CMA ([facility]), ROT    
Spoke to pt to let her know Dr. Espinoza was ok with having her come in 7/10 and extending her work restrictions until then. Work note provided to pt via [employer].    Dago ARRIOLA CMA ([facility]), ROT    
Male

## 2024-07-02 ENCOUNTER — TELEPHONE (OUTPATIENT)
Dept: INTERNAL MEDICINE | Facility: CLINIC | Age: 29
End: 2024-07-02
Payer: COMMERCIAL

## 2024-07-02 NOTE — TELEPHONE ENCOUNTER
Patient still has active lab orders from April. Letter has been mailed along with Whyte[employer] message. Attempted to call patient with no answer. Ok to cancel orders ?

## 2024-07-10 ENCOUNTER — TELEPHONE (OUTPATIENT)
Dept: ORTHOPEDIC SURGERY | Facility: CLINIC | Age: 29
End: 2024-07-10

## 2024-07-10 ENCOUNTER — OFFICE VISIT (OUTPATIENT)
Dept: ORTHOPEDIC SURGERY | Facility: CLINIC | Age: 29
End: 2024-07-10
Payer: COMMERCIAL

## 2024-07-10 VITALS
WEIGHT: 181 LBS | BODY MASS INDEX: 30.9 KG/M2 | SYSTOLIC BLOOD PRESSURE: 112 MMHG | DIASTOLIC BLOOD PRESSURE: 74 MMHG | HEIGHT: 64 IN

## 2024-07-10 DIAGNOSIS — M79.672 LEFT FOOT PAIN: Primary | ICD-10-CM

## 2024-07-10 DIAGNOSIS — M25.572 ACUTE LEFT ANKLE PAIN: ICD-10-CM

## 2024-07-10 NOTE — LETTER
July 10, 2024     Patient: Kayla Askew   YOB: 1995   Date of Visit: 7/10/2024       To Whom It May Concern:    It is my medical opinion that Kayla Askew may be off of work until further notice. She will be re-evaluated at her next appointment that will be made after her testing is complete.       Sincerely,        Robi Espinoza MD

## 2024-07-10 NOTE — TELEPHONE ENCOUNTER
Called patient and let her know that Dr. Espinoza wanted her off work until her follow up. Patient asked if she could get a note stating that, I asked if it would be okay to send her the note via [employer]. Patient said that was great. I told her to let us know if she needed anything else. Patient verbalized understanding.     Kasia Granados CMA ([facility])

## 2024-07-10 NOTE — PROGRESS NOTES
"                          [facility] Office Follow Up     Office Follow Up Visit     Date: 07/10/2024   Patient Name: Kayla Askew  MRN: [id number]  YOB: 1995  Chief Complaint:   Chief Complaint   Patient presents with    Follow-up     2 month follow up -Acute left ankle pain       History of Present Illness:   Kayla Askew is a 29 y.o. female who is here today for follow up for left foot/ankle pain.  Last seen in clinic on May 10.  At that visit we sent her to get a custom AFO.  Patient has not been wearing AFO and states it does not help with her pain and is now causing her further foot discomfort.  Is currently off work due to her symptoms.  Here today for further management.    Subjective   I reviewed the patient's chief complaint, history of present illness, review of systems, past medical history, surgical history, family history, social history, medications and allergy list   Objective    Vital Signs:   Vitals:    07/10/24 1037   BP: 112/74   Weight: 82.1 kg (181 lb)   Height: 161.3 cm (63.5\")     Body mass index is 31.56 kg/m².    Ortho Exam:  left LE Foot and Ankle Exam:   Plantigrade foot.   There is good perfusion to the toes.   The skin is intact throughout the foot and ankle.  Range of motion of ankle, foot and toes is within normal limits.   There is tenderness to palpation directly over prominent bone palpable at the dorsal navicular, also tender to the soft tissues surrounding the area.    Normal subtalar motion, does not have decreased subtalar motion compared to contralateral, subtalar motion does not produce her symptoms.  Her symptoms are most reliably reproduced with maximum ankle dorsiflexion.    Results Review:  No new imaging    Assessment / Plan    Assessment/Plan:   Diagnoses and all orders for this visit:    1. Left foot pain (Primary)  -     CT ankle left wo contrast; Future    2. Acute left ankle pain  -     CT ankle left wo contrast; " Future      Returns to clinic today for her persistent left ankle pain.  Physical exam similar to previous.  Due to the persistence of her symptoms a CT scan was ordered today.  Will plan to see patient back for further management following completion of CT scan.  Provided with note for work to be off till follow-up.    Follow Up:   No follow-ups on file.      Robi Espinoza MD  [facility] Orthopedic Surgeon

## 2024-07-10 NOTE — TELEPHONE ENCOUNTER
Provider: LUANA    Caller: LES DAVIS    Relationship to Patient: PATIENT    Pharmacy: NA    Phone Number: [phone] (home)       Reason for Call: PATIENT NEEDS TO KNOW HER WORK STATUS

## 2024-07-31 ENCOUNTER — TELEPHONE (OUTPATIENT)
Dept: INTERNAL MEDICINE | Facility: CLINIC | Age: 29
End: 2024-07-31

## 2024-07-31 NOTE — TELEPHONE ENCOUNTER
Patient has been called, left message with return phone number    Relay:   We have received paperwork for FMLA. Was this suppose to go to Dr. Espinoza's office to be completed?

## 2024-07-31 NOTE — TELEPHONE ENCOUNTER
Name: Kayla Askew      Relationship: Self      Best Callback Number: [phone]       HUB PROVIDED THE RELAY MESSAGE FROM THE OFFICE      PATIENT: VOICED UNDERSTANDING AND HAS NO FURTHER QUESTIONS AT THIS TIME    ADDITIONAL INFORMATION:PATIENT STATES THAT [facility] IS SUPPOSED TO GO TO DR PONCE OFFICE

## 2024-08-01 ENCOUNTER — TELEPHONE (OUTPATIENT)
Dept: INTERNAL MEDICINE | Facility: CLINIC | Age: 29
End: 2024-08-01

## 2024-08-23 ENCOUNTER — TELEPHONE (OUTPATIENT)
Dept: ORTHOPEDIC SURGERY | Facility: CLINIC | Age: 29
End: 2024-08-23
Payer: COMMERCIAL

## 2024-08-23 NOTE — TELEPHONE ENCOUNTER
Caller: LES     Relationship: SELF    Best call back number: [phone] (home)       What form or medical record are you requesting:   PROGNOTES FROM 07/10/24 FOR SHORT TERM DISABILITY     How would you like to receive the form or medical records (pick-up, mail, fax): MY CHART     ALSO CALLED TO UPDATE INSURANCE AND IS ASKING FOR THE CT SCAN TO BE APPROVED

## 2024-08-26 NOTE — TELEPHONE ENCOUNTER
Caller: LES    Relationship: SELF    Best call back number: [phone]    What is the best time to reach you: ANY    Who are you requesting to speak with (clinical staff, provider,  specific staff member): CLINICAL    What was the call regarding: CHECKING STATUS ON PPWK REQUEST 8/23/24- PLEASE CALL    Is it okay if the provider responds through MyChart: CALL

## 2024-11-06 ENCOUNTER — HOSPITAL ENCOUNTER (OUTPATIENT)
Dept: CT IMAGING | Facility: HOSPITAL | Age: 29
Discharge: HOME | End: 2024-11-06
Admitting: ORTHOPAEDIC SURGERY
Payer: COMMERCIAL

## 2024-11-06 ENCOUNTER — TELEPHONE (OUTPATIENT)
Dept: ORTHOPEDIC SURGERY | Facility: CLINIC | Age: 29
End: 2024-11-06

## 2024-11-06 DIAGNOSIS — M25.572 ACUTE LEFT ANKLE PAIN: ICD-10-CM

## 2024-11-06 DIAGNOSIS — M79.672 LEFT FOOT PAIN: ICD-10-CM

## 2024-11-06 PROCEDURE — 73700 CT LOWER EXTREMITY W/O DYE: CPT

## 2024-11-06 NOTE — TELEPHONE ENCOUNTER
Caller: Kayla Askew    Relationship to patient: Self    Best call back number:     Chief complaint: LEFT ANKLE    Type of visit: FUP OF CT    Requested date: NEXT WEEK     Additional notes:PATIENT HAD CT SCAN DONE TODAY AND NEEDS FUP.

## 2024-11-20 ENCOUNTER — OFFICE VISIT (OUTPATIENT)
Dept: ORTHOPEDIC SURGERY | Facility: CLINIC | Age: 29
End: 2024-11-20
Payer: COMMERCIAL

## 2024-11-20 VITALS
DIASTOLIC BLOOD PRESSURE: 84 MMHG | SYSTOLIC BLOOD PRESSURE: 124 MMHG | HEIGHT: 64 IN | WEIGHT: 181 LBS | BODY MASS INDEX: 30.9 KG/M2

## 2024-11-20 DIAGNOSIS — M79.672 LEFT FOOT PAIN: Primary | ICD-10-CM

## 2024-11-20 NOTE — PROGRESS NOTES
"                          [facility] [place] [facility] Follow Up     Office Follow Up Visit     Date: 11/20/2024   Patient Name: Kayla Askew  MRN: [id number]  YOB: 1995  Chief Complaint:   Chief Complaint   Patient presents with    Follow-up     After left ankle CT scan 11/06/2024     History of Present Illness:   Kayla Askew is a 29 y.o. female who is here today for follow up for follow-up for left lateral hindfoot/ankle pain.  Patient was last seen in clinic on July 10.  At that visit her pain was persistent.  We elected to get a CT scan of her ankle at that time.  The plan was for patient to get CT scan completed and then follow-up following completion of CT scan for further management.  Was given a note to be off work until follow-up.  That was 4-1/2 months ago.  Patient states CT got delayed due to insurance reasons.  States has been off work since that time.  Reports she is on long-term disability.  States again today her symptoms are unchanged.  Works for [employer] as a .  Reports that she saw to previous physicians before her original visit with me in April.  States both physicians previously recommended work restrictions however patient reports that her work would not accommodate those restrictions.  Patient states she does not know what or if any restrictions would be allowed by her employer in order for her to return to work.    Subjective   I reviewed the patient's chief complaint, history of present illness, review of systems, past medical history, surgical history, family history, social history, medications and allergy list   Objective    Vital Signs:   Vitals:    11/20/24 1416   BP: 124/84   Weight: 82.1 kg (181 lb)   Height: 161.3 cm (63.5\")     Body mass index is 31.56 kg/m².    Ortho Exam:  left LE Foot and Ankle Exam:   Plantigrade foot.   There is good perfusion to the toes.   The skin is intact throughout the foot and ankle.  Range of motion of ankle, " foot and toes is within normal limits.   There is tenderness to palpation directly over prominent bone palpable at the dorsal navicular, also tender to the soft tissues surrounding the area.    Normal subtalar motion, does not have decreased subtalar motion compared to contralateral, subtalar motion does not produce her symptoms.  Her symptoms are most reliably reproduced with maximum ankle dorsiflexion.    Results Review:  CT ankle left wo contrast  Narrative: CT ANKLE LEFT WO CONTRAST    Date of Exam: 11/6/2024 9:08 AM EST    Indication: left talonavicular coalition, surgical planning.    Comparison: 3/17/2024 MRI and 4/5/2024 radiograph    Technique: Axial CT images were obtained of the left ankle without contrast administration.  Reconstructed coronal and sagittal images were also obtained. Automated exposure control and iterative construction methods were used.    Findings:  There is no evidence of acute fracture. There is mild bony productive changes of the dorsal navicular bone. There is no evidence of osseous talonavicular coalition. No suspicious bone lesion. Normal joint alignment.    There is mild subcutaneous edema along the lateral aspect of the ankle and hindfoot. No drainable or well-defined fluid collection.  Impression: Impression:  No acute abnormality of the left ankle. No evidence of osseous talonavicular coalition.    Subcutaneous edema along the lateral aspect of the ankle and hindfoot without drainable or well-defined fluid collection.    Electronically Signed: Lamont Johnson MD    11/7/2024 4:26 PM EST    Workstation ID: [id number]   11/21/24 I have personally reviewed and interpreted the images from outside facility with the documented findings, prominent bone dorsal navicular, no evidence of osseous correlation.  No other acute osseous abnormalities.    Assessment / Plan    Assessment/Plan:   Diagnoses and all orders for this visit:    1. Left foot pain (Primary)      Returns to clinic for  persistent left ankle pain.  Has been off work since last visit on July 10.  Patient states symptoms unchanged.  Patient reports that her ankle pain is not limiting her ADL however her symptoms are very limiting with regard to her duties at work.  Patient does not think she can return to work in the same capacity as previous with her current symptoms.  We had a long discussion regarding her options.  We discussed possible surgical intervention.  Possible surgical intervention would include exostectomy of prominent bone.  I spent a long time talking the patient about the surgical plan and and that I am not 100% sure that removing the prominent bone will relieve her symptoms.  Patient expressed understanding.  We further discussed her ability to return to work with restrictions.  There is seems to be a lack of clarity with regard to what restrictions her work would allow both with and without possible surgical intervention.  Following our long discussion both patient and I agree that it would be beneficial for the patient to know what type of work restrictions her work would allow both with conservative treatment or with surgical treatment.  Once patient has an answer to these questions she will plan to return to clinic for further discussion and further management.  It was a pleasure seeing her today.    Follow Up:   Return if symptoms worsen or fail to improve.      Robi Espinoza MD  [facility] [place] [facility] Surgeon

## 2024-11-21 ENCOUNTER — TELEPHONE (OUTPATIENT)
Dept: ORTHOPEDIC SURGERY | Facility: CLINIC | Age: 29
End: 2024-11-21
Payer: COMMERCIAL

## 2024-11-21 NOTE — TELEPHONE ENCOUNTER
Caller: MANNY   Relationship to Patient: CARLOS   Phone Number: [phone]  Reason for Call: CALLING WANTING TO SPEAK TO SOMEONE ABOUT THE PAPERWORK THEY RECIEVED BACK ABOUT HER LONG TERM DISABILITY PAPERWORK

## 2024-11-21 NOTE — TELEPHONE ENCOUNTER
Called and spoke to patient about restrictions. Patient said her employer is giving her a hard time with restrictions and not accommodating them. She had reached out to her manager but her manager was unsuccessful at guiding her into the right direction. Her  at [employer] is Sudeep. She has had some difficulties with walking, climbing stairs, prolonged standing and walking. She would like some restrictions and talked with Dr Espinoza regarding these at her visit yesterday 11/20/2024. 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 Advised patient that I will contact her  at [facility] [employer] and try to come to a conclusion as far as restrictions/accommodations.

## 2025-01-15 ENCOUNTER — OFFICE VISIT (OUTPATIENT)
Dept: ORTHOPEDIC SURGERY | Facility: CLINIC | Age: 30
End: 2025-01-15
Payer: COMMERCIAL

## 2025-01-15 VITALS
WEIGHT: 181 LBS | DIASTOLIC BLOOD PRESSURE: 80 MMHG | BODY MASS INDEX: 30.9 KG/M2 | HEIGHT: 64 IN | SYSTOLIC BLOOD PRESSURE: 120 MMHG

## 2025-01-15 DIAGNOSIS — M25.572 ACUTE LEFT ANKLE PAIN: Primary | ICD-10-CM

## 2025-01-15 NOTE — PROGRESS NOTES
"                          [facility] [place] [facility] Follow Up     Office Follow Up Visit     Date: 01/15/2025   Patient Name: Kayla Askew  MRN: [id number]  YOB: 1995  Chief Complaint:   Chief Complaint   Patient presents with    Follow-up     2 month follow up-- left ankle pain     History of Present Illness:   Kayla Askew is a 29 y.o. female who is here today for follow-up left dorsal lateral hindfoot pain.  Last seen in clinic on November 20.  States symptoms are similar.  Continues to have pain in the same area that is worse with activity.  States continues to be off work.  Here today to discuss further management.    Subjective   I reviewed the patient's chief complaint, history of present illness, review of systems, past medical history, surgical history, family history, social history, medications and allergy list   Objective    Vital Signs:   Vitals:    01/15/25 1415   BP: 120/80   Weight: 82.1 kg (181 lb)   Height: 161.3 cm (63.5\")     Body mass index is 31.56 kg/m².    Ortho Exam:  left LE Foot and Ankle Exam:   Plantigrade foot.   There is good perfusion to the toes.   The skin is intact throughout the foot and ankle.  Range of motion of ankle, foot and toes is within normal limits.   There is tenderness to palpation directly over prominent bone palpable at the dorsal navicular, also tender to the soft tissues surrounding the area.      Results Review:  No new imaging    Assessment / Plan    Assessment/Plan:   Diagnoses and all orders for this visit:    1. Acute left ankle pain (Primary)      Returns to clinic for persistent left ankle pain.  We spent a long time in discussion with regard to patient's concerning symptoms as well as further management.  We discussed that returning to work with restrictions and seeing how things go will help determine continued plan.  Patient still considering surgical intervention however would like to see how things go at work with " restrictions.  Provided patient with written restrictions stating no ladders, no carrying over 20 pounds, and frequent breaks if experiencing left foot pain.  Will plan for patient to have these restrictions until follow-up in 6 weeks.  Will plan to see her back in 6 weeks for reevaluation.  Also recommended patient try Voltaren gel and provided her with information on obtaining Voltaren gel in clinic today.    Follow Up:   Return in about 6 weeks (around 2/26/2025) for Recheck.      Robi Espinoza MD  [facility] [place] [facility]

## 2025-01-15 NOTE — PATIENT INSTRUCTIONS
This topical gel also contains the active ingredient Diclofenac (an NSAID) which helps to reduce inflammation caused by arthritis or other chronic conditions  You can apply this gel directly to the skin up to four times per day over the area that is most painful.

## 2025-02-21 ENCOUNTER — TELEMEDICINE (OUTPATIENT)
Dept: FAMILY MEDICINE CLINIC | Facility: TELEHEALTH | Age: 30
End: 2025-02-21
Payer: COMMERCIAL

## 2025-02-21 DIAGNOSIS — B34.9 VIRAL ILLNESS: Primary | ICD-10-CM

## 2025-02-21 RX ORDER — OSELTAMIVIR PHOSPHATE 75 MG/1
75 CAPSULE ORAL 2 TIMES DAILY
Qty: 10 CAPSULE | Refills: 0 | Status: SHIPPED | OUTPATIENT
Start: 2025-02-21 | End: 2025-02-26

## 2025-02-21 RX ORDER — METHYLPREDNISOLONE 4 MG/1
TABLET ORAL
Qty: 21 TABLET | Refills: 0 | Status: SHIPPED | OUTPATIENT
Start: 2025-02-21

## 2025-02-21 NOTE — LETTER
February 21, 2025     Patient: Kayla Askew   YOB: 1995   Date of Visit: 2/21/2025       To Whom It May Concern:    It is my medical opinion that Kayla Askew  should be excused from work 2/20/25, 2/21/25 and 2/22/25 .           Sincerely,        HANNAH Chang    CC: No Recipients

## 2025-02-21 NOTE — LETTER
February 21, 2025     Patient: Kayla Askew   YOB: 1995   Date of Visit: 2/21/2025       To Whom It May Concern:    It is my medical opinion that Kayla Askew  should be excused from work 2/20/25 .           Sincerely,        HANNAH Chang    CC: No Recipients

## 2025-02-21 NOTE — PROGRESS NOTES
KELL Askew is a 30 y.o. female  presents with complaint of <48 hour history of cough, congestion (thick mucus), tactile fever with sweats, chills, sore throat (has improved). Denies body aches, SOA, CP, N/V/D. No known exposure to flu but does work with public.     Review of Systems    Past Medical History:   Diagnosis Date    Asthma     Depression     Fracture, foot 12/16/2023    Low back pain     Menses painful        Family History   Problem Relation Age of Onset    Thyroid disease Mother     Migraines Mother     Hypertension Father     Hyperlipidemia Father     Diabetes Father     Arthritis Maternal Aunt     Arthritis Paternal Aunt     Arthritis Maternal Grandmother     Arthritis Maternal Grandfather     Arthritis Paternal Grandmother     Arthritis Paternal Grandfather        Social History     Socioeconomic History    Marital status: Significant Other   Tobacco Use    Smoking status: Never     Passive exposure: Never    Smokeless tobacco: Never   Vaping Use    Vaping status: Never Used   Substance and Sexual Activity    Alcohol use: Yes     Alcohol/week: 8.0 standard drinks of alcohol     Types: 3 Shots of liquor, 5 Drinks containing 0.5 oz of alcohol per week     Comment: Socially    Drug use: No    Sexual activity: Yes     Partners: Male         There were no vitals taken for this visit.    PHYSICAL EXAM  Physical Exam   Constitutional: She appears well-developed and well-nourished.   HENT:   Head: Normocephalic.   Nose: Nose normal.   Neck: Neck normal appearance.  Pulmonary/Chest: Effort normal.   Neurological: She is alert.   Psychiatric: She has a normal mood and affect. Her speech is normal.       Diagnoses and all orders for this visit:    1. Viral illness (Primary)  -     oseltamivir (Tamiflu) 75 MG capsule; Take 1 capsule by mouth 2 (Two) Times a Day for 5 days.  Dispense: 10 capsule; Refill: 0  -     methylPREDNISolone (MEDROL) 4 MG dose pack; Take as directed on package instructions.   Dispense: 21 tablet; Refill: 0          FOLLOW-UP  As discussed during visit with [facility], if symptoms worsen or fail to improve, follow-up with PCP/Urgent Care/Emergency Department.    Patient verbalizes understanding of medications, instructions for treatment and follow-up.    Liliam Lindquist, APRN  02/21/2025  11:35 EST      Mode of Visit: Video  Location of patient: -HOME-  Location of provider: Home  You have chosen to receive care through a telehealth visit.  The patient has signed the video visit consent form.  The visit included audio and video interaction. No technical issues occurred during this visit.

## 2025-02-26 ENCOUNTER — TELEPHONE (OUTPATIENT)
Dept: ORTHOPEDIC SURGERY | Facility: CLINIC | Age: 30
End: 2025-02-26

## 2025-02-26 ENCOUNTER — OFFICE VISIT (OUTPATIENT)
Dept: ORTHOPEDIC SURGERY | Facility: CLINIC | Age: 30
End: 2025-02-26
Payer: COMMERCIAL

## 2025-02-26 VITALS — HEIGHT: 64 IN | WEIGHT: 181 LBS | BODY MASS INDEX: 30.9 KG/M2

## 2025-02-26 DIAGNOSIS — M25.572 ACUTE LEFT ANKLE PAIN: Primary | ICD-10-CM

## 2025-02-26 NOTE — PROGRESS NOTES
"                          [facility] Follow Up     Office Follow Up Visit     Date: 02/26/2025   Patient Name: Kayla Askew  MRN: [id number]  YOB: 1995  Chief Complaint:   Chief Complaint   Patient presents with    Follow-up     6 week follow up - -Acute left ankle pain       History of Present Illness:   Kayla Askew is a 30 y.o. female who is here today for follow-up for left dorsal lateral ankle pain.  Last seen in clinic on January 15.  Once again states symptoms are similar.  States has returned to work doing a different job than previous.  Reports that she is still up on her feet doing activities at work that continue to cause pain.  Does not feel as though she is getting frequent enough breaks and her symptoms have persisted.    Subjective   I reviewed the patient's chief complaint, history of present illness, review of systems, past medical history, surgical history, family history, social history, medications and allergy list   Objective    Vital Signs:   Vitals:    02/26/25 1304   Weight: 82.1 kg (181 lb)   Height: 161.3 cm (63.5\")     Body mass index is 31.56 kg/m².    Ortho Exam:  left LE Foot and Ankle Exam:   Plantigrade foot.   There is good perfusion to the toes.   The skin is intact throughout the foot and ankle.  Range of motion of ankle, foot and toes is within normal limits.   There is tenderness to palpation directly over prominent bone palpable at the dorsal navicular, also tender to the soft tissues surrounding the area.      Results Review:  No new imaging    Assessment / Plan    Assessment/Plan:   Diagnoses and all orders for this visit:    1. Acute left ankle pain (Primary)      Patient returns to clinic for 6-week follow-up.  States symptoms have persisted and are unchanged.  Previously provided with work restrictions stating no ladders and no carrying over 20 pounds as well as frequent breaks if experiencing left foot pain.  Patient is having " similar symptoms and therefore I provided her with another work note recommending the same restrictions as previous.  We once again discussed possible surgical intervention.  Both patient and I agree that it makes sense for her to find a good work solution prior to considering surgery as surgery may only complicate her work situation as well as return to work.  Will plan to see patient back in 6 weeks for reevaluation.      Follow Up:   Return in about 6 weeks (around 4/9/2025) for Recheck.      Robi Espinoza MD  [facility] [place] [facility] Surgeon

## 2025-04-09 ENCOUNTER — OFFICE VISIT (OUTPATIENT)
Dept: ORTHOPEDIC SURGERY | Facility: CLINIC | Age: 30
End: 2025-04-09
Payer: COMMERCIAL

## 2025-04-09 VITALS
WEIGHT: 235 LBS | HEIGHT: 64 IN | BODY MASS INDEX: 40.12 KG/M2 | SYSTOLIC BLOOD PRESSURE: 114 MMHG | DIASTOLIC BLOOD PRESSURE: 80 MMHG

## 2025-04-09 DIAGNOSIS — M25.572 ACUTE LEFT ANKLE PAIN: Primary | ICD-10-CM

## 2025-04-09 NOTE — PROGRESS NOTES
"                          [facility] [place] [facility] Office Follow Up     Office Follow Up Visit     Date: 04/09/2025   Patient Name: Kayla Askew  MRN: [id number]  YOB: 1995  Chief Complaint:   Chief Complaint   Patient presents with    Follow-up     6 week follow up - Acute left ankle pain     History of Present Illness:   Kayla Askew is a 30 y.o. female who is here today for follow-up for left dorsal lateral ankle pain.  Last seen in clinic on February 26.  Here today for 6-week follow-up.   recently spent a week off of work which did help with some of her symptoms.  Continues to work at [employer] doing a different job than previous.  Once again reports she continues to do activities at work that cause her pain.  Says her symptoms are unchanged from previous.    Subjective   I reviewed the patient's chief complaint, history of present illness, review of systems, past medical history, surgical history, family history, social history, medications and allergy list   Objective    Vital Signs:   Vitals:    04/09/25 1410   BP: 114/80   Weight: 107 kg (235 lb)   Height: 161.3 cm (63.5\")     Body mass index is 40.97 kg/m².    Ortho Exam:  left LE Foot and Ankle Exam:   Plantigrade foot.   There is good perfusion to the toes.   The skin is intact throughout the foot and ankle.  Range of motion of ankle, foot and toes is within normal limits.   There is tenderness to palpation directly over prominent bone palpable at the dorsal navicular, also tender to the soft tissues surrounding the area.      Results Review:  No new imaging    Assessment / Plan    Assessment/Plan:   Diagnoses and all orders for this visit:    1. Acute left ankle pain (Primary)      Patient returns to clinic for 6-week follow-up.   continues to have symptoms that are unchanged from previous.   has continued to do different job at work however symptoms have been persistent.  Reports she will be meeting with HR soon " to discuss possible change in her schedule and work duties that may be more accommodative or possibly helpful in taking time off for surgical intervention.  Patient reports she will follow-up on an as-needed basis for further management following further communication/meetings with her supervisors.  Not requesting any thing else at today's visit.    Follow Up:   Return if symptoms worsen or fail to improve.      Robi Espinoza MD  [facility] [place] [facility] Sur[facility]

## 2025-07-31 ENCOUNTER — OFFICE VISIT (OUTPATIENT)
Dept: INTERNAL MEDICINE | Facility: CLINIC | Age: 30
End: 2025-07-31
Payer: COMMERCIAL

## 2025-07-31 ENCOUNTER — LAB (OUTPATIENT)
Dept: LAB | Facility: HOSPITAL | Age: 30
End: 2025-07-31
Payer: COMMERCIAL

## 2025-07-31 VITALS
DIASTOLIC BLOOD PRESSURE: 74 MMHG | WEIGHT: 225.2 LBS | HEIGHT: 64 IN | HEART RATE: 61 BPM | OXYGEN SATURATION: 98 % | TEMPERATURE: 98 F | SYSTOLIC BLOOD PRESSURE: 108 MMHG | BODY MASS INDEX: 38.45 KG/M2

## 2025-07-31 DIAGNOSIS — E66.812 CLASS 2 OBESITY DUE TO EXCESS CALORIES WITHOUT SERIOUS COMORBIDITY WITH BODY MASS INDEX (BMI) OF 39.0 TO 39.9 IN ADULT: ICD-10-CM

## 2025-07-31 DIAGNOSIS — Z76.89 ESTABLISHING CARE WITH NEW DOCTOR, ENCOUNTER FOR: Primary | ICD-10-CM

## 2025-07-31 DIAGNOSIS — Z11.59 ENCOUNTER FOR HEPATITIS C SCREENING TEST FOR LOW RISK PATIENT: ICD-10-CM

## 2025-07-31 DIAGNOSIS — Z13.29 SCREENING FOR THYROID DISORDER: ICD-10-CM

## 2025-07-31 DIAGNOSIS — R53.83 FEELING TIRED: ICD-10-CM

## 2025-07-31 DIAGNOSIS — Z83.3 FAMILY HISTORY OF DIABETES MELLITUS: ICD-10-CM

## 2025-07-31 DIAGNOSIS — Z13.220 SCREENING FOR LIPID DISORDERS: ICD-10-CM

## 2025-07-31 DIAGNOSIS — N92.6 MENSTRUAL CYCLE DISORDER: ICD-10-CM

## 2025-07-31 DIAGNOSIS — E66.09 CLASS 2 OBESITY DUE TO EXCESS CALORIES WITHOUT SERIOUS COMORBIDITY WITH BODY MASS INDEX (BMI) OF 39.0 TO 39.9 IN ADULT: ICD-10-CM

## 2025-07-31 DIAGNOSIS — Z01.419 ENCOUNTER FOR WELL WOMAN EXAM: ICD-10-CM

## 2025-07-31 DIAGNOSIS — Z13.1 SCREENING FOR DIABETES MELLITUS: ICD-10-CM

## 2025-07-31 DIAGNOSIS — Z86.19 HISTORY OF SHINGLES: ICD-10-CM

## 2025-07-31 DIAGNOSIS — Z23 NEED FOR VACCINATION: ICD-10-CM

## 2025-07-31 DIAGNOSIS — Z13.31 DEPRESSION SCREEN: ICD-10-CM

## 2025-07-31 PROCEDURE — 86803 HEPATITIS C AB TEST: CPT

## 2025-07-31 PROCEDURE — 85027 COMPLETE CBC AUTOMATED: CPT

## 2025-07-31 PROCEDURE — 80053 COMPREHEN METABOLIC PANEL: CPT

## 2025-07-31 PROCEDURE — 82607 VITAMIN B-12: CPT

## 2025-07-31 PROCEDURE — 83540 ASSAY OF IRON: CPT

## 2025-07-31 PROCEDURE — 82746 ASSAY OF FOLIC ACID SERUM: CPT

## 2025-07-31 PROCEDURE — 81003 URINALYSIS AUTO W/O SCOPE: CPT

## 2025-07-31 PROCEDURE — 83036 HEMOGLOBIN GLYCOSYLATED A1C: CPT

## 2025-07-31 PROCEDURE — 82306 VITAMIN D 25 HYDROXY: CPT

## 2025-07-31 PROCEDURE — 80061 LIPID PANEL: CPT

## 2025-07-31 PROCEDURE — 84443 ASSAY THYROID STIM HORMONE: CPT

## 2025-07-31 RX ORDER — ACETAMINOPHEN 500 MG
500 TABLET ORAL EVERY 6 HOURS PRN
COMMUNITY

## 2025-07-31 NOTE — PROGRESS NOTES
Office Note     Name: Kayla Askew    : 1995     MRN: [id number]     Chief Complaint  Establish Care    Subjective     History of Present Illness:  Kayla Askew is a 30 y.o. female who presents today for establish care    Patient's main concern today is related to painful menstrual cycles.  Described as debilitating.  They are fairly regular about every 21 to 28 days.  She will occasionally have a cycle that is about a week late.  Her bleeding last about 5 to 6 days.  They are heavier for the first 2 days.  She has quite a bit of abdominal cramping with throbbing and aching.  She is not currently seeing GYN.  She would ideally like to not be on birth control if possible because she did not like the way it made her feel.  She states that she is interested in fertility in the future depending on the right partner.  She does track her cycles but just mainly when they start and how long they last.  She thinks there is a family history of PCOS.  She does also endorse clots with her cycles    The symptoms above have made her feel more tired and nauseous as well    Patient is no longer vaping.  Occasional marijuana use.  Social alcohol use    No specific dietary or exercise pattern    Patient was willing to have Tdap vaccination in office today    Not currently on any medications    She does have a history of shingles    Denies any major surgeries    Family history includes mother with skin cancer.  Maternal grandmother with pancreatic cancer.  Father with diabetes.  She does report family history of diabetes        Past Medical History:   Diagnosis Date    Asthma     Depression     Fracture, foot 2023    Low back pain     Menses painful        History reviewed. No pertinent surgical history.    Social History     Socioeconomic History    Marital status: Significant Other   Tobacco Use    Smoking status: Never     Passive exposure: Never    Smokeless tobacco: Never   Vaping Use    Vaping status:  "Never Used   Substance and Sexual Activity    Alcohol use: Yes     Alcohol/week: 8.0 standard drinks of alcohol     Types: 3 Shots of liquor, 5 Drinks containing 0.5 oz of alcohol per week     Comment: Socially    Drug use: No    Sexual activity: Yes     Partners: Male         Current Outpatient Medications:     acetaminophen (TYLENOL) 500 MG tablet, Take 1 tablet by mouth Every 6 (Six) Hours As Needed for Mild Pain., Disp: , Rfl:     Objective     Vital Signs  /74 (BP Location: Left arm, Patient Position: Sitting, Cuff Size: Adult)   Pulse 61   Temp 98 °F (36.7 °C)   Ht 161.3 cm (63.5\")   Wt 102 kg (225 lb 3.2 oz)   SpO2 98%   BMI 39.27 kg/m²   Estimated body mass index is 39.27 kg/m² as calculated from the following:    Height as of this encounter: 161.3 cm (63.5\").    Weight as of this encounter: 102 kg (225 lb 3.2 oz).    Class 2 Severe Obesity (BMI >=35 and <=39.9). Obesity-related health conditions include the following: none. Obesity is newly identified. BMI is is above average; BMI management plan is completed. We discussed portion control and increasing exercise.        PHQ-9 Depression Screening  Little interest or pleasure in doing things? Not at all   Feeling down, depressed, or hopeless? Not at all   PHQ-2 Total Score 0   Trouble falling or staying asleep, or sleeping too much?     Feeling tired or having little energy?     Poor appetite or overeating?     Feeling bad about yourself - or that you are a failure or have let yourself or your family down?     Trouble concentrating on things, such as reading the newspaper or watching television?     Moving or speaking so slowly that other people could have noticed? Or the opposite - being so fidgety or restless that you have been moving around a lot more than usual?     Thoughts that you would be better off dead, or of hurting yourself in some way?     PHQ-9 Total Score     If you checked off any problems, how difficult have these problems made " it for you to do your work, take care of things at home, or get along with other people? Not difficult at all     PHQ-9 Total Score:               Physical Exam  Vitals and nursing note reviewed.   Constitutional:       Appearance: Normal appearance. She is obese.   HENT:      Head: Normocephalic and atraumatic.   Eyes:      Extraocular Movements: Extraocular movements intact.      Pupils: Pupils are equal, round, and reactive to light.   Cardiovascular:      Rate and Rhythm: Normal rate and regular rhythm.      Pulses: Normal pulses.           Radial pulses are 2+ on the right side and 2+ on the left side.        Posterior tibial pulses are 2+ on the right side and 2+ on the left side.      Heart sounds: Normal heart sounds, S1 normal and S2 normal.   Pulmonary:      Effort: Pulmonary effort is normal.      Breath sounds: Normal breath sounds.   Abdominal:      General: Bowel sounds are normal.      Palpations: Abdomen is soft.   Musculoskeletal:         General: Normal range of motion.   Skin:     General: Skin is warm and dry.   Neurological:      Mental Status: She is alert and oriented to person, place, and time.      Comments: Mental status fully intact as patient was able to provide a detailed description of the events   Psychiatric:         Mood and Affect: Mood normal.         Behavior: Behavior normal.         Thought Content: Thought content normal.         Judgment: Judgment normal.               Assessment and Plan     Diagnoses and all orders for this visit:    1. Establishing care with new doctor, encounter for (Primary)    2. Encounter for well woman exam  -     Ambulatory Referral to Obstetrics / Gynecology    3. Menstrual cycle disorder  -     CBC (No Diff); Future  -     Comprehensive Metabolic Panel; Future  -     Urinalysis With Culture If Indicated -; Future    4. Feeling tired  -     Vitamin D,25-Hydroxy; Future  -     Vitamin B12; Future  -     Folate; Future  -     Iron; Future    5. Class 2  obesity due to excess calories without serious comorbidity with body mass index (BMI) of 39.0 to 39.9 in adult  -     CBC (No Diff); Future  -     Comprehensive Metabolic Panel; Future  -     Urinalysis With Culture If Indicated -; Future    6. Screening for diabetes mellitus  -     Hemoglobin A1c; Future    7. Screening for lipid disorders  -     Lipid Panel; Future    8. Screening for thyroid disorder  -     TSH Rfx On Abnormal To Free T4; Future    9. Encounter for hepatitis C screening test for low risk patient  -     Hepatitis C Antibody; Future    10. Need for vaccination  -     Tdap Vaccine Greater Than or Equal To 8yo IM    11. Family history of diabetes mellitus    12. History of shingles    13. Depression screen    Plan  Establish care visit completed with patient today    Updated referral to [facility] was placed.  I did encourage patient between now and visit with [facility] to start monitoring her cycles as well as ovulation window.  Continue with medication management and other alternative remedies to assist with symptoms    Tdap vaccination will be provided in office today    Labs are ordered and will be obtained today.  Patient will be notified of results    Depression screen with negative results    Go to ER if any condition worsens or severe    Plan to follow-up in January/start of the year for annual physical exam     Follow Up  Return for january for physical .    HANNAH Mcnulty    Part of this note may be an electronic transcription/translation of spoken language to printed text using the Dragon Dictation System.

## 2025-08-01 ENCOUNTER — PATIENT ROUNDING (BHMG ONLY) (OUTPATIENT)
Dept: INTERNAL MEDICINE | Facility: CLINIC | Age: 30
End: 2025-08-01
Payer: COMMERCIAL

## 2025-08-01 ENCOUNTER — RESULTS FOLLOW-UP (OUTPATIENT)
Dept: INTERNAL MEDICINE | Facility: CLINIC | Age: 30
End: 2025-08-01
Payer: COMMERCIAL

## 2025-08-01 DIAGNOSIS — E78.00 ELEVATED LDL CHOLESTEROL LEVEL: ICD-10-CM

## 2025-08-01 DIAGNOSIS — E55.9 VITAMIN D INSUFFICIENCY: Primary | ICD-10-CM

## 2025-08-01 LAB
25(OH)D3 SERPL-MCNC: 21.5 NG/ML (ref 30–100)
ALBUMIN SERPL-MCNC: 3.5 G/DL (ref 3.5–5.2)
ALBUMIN/GLOB SERPL: 1.1 G/DL
ALP SERPL-CCNC: 66 U/L (ref 39–117)
ALT SERPL W P-5'-P-CCNC: 11 U/L (ref 1–33)
ANION GAP SERPL CALCULATED.3IONS-SCNC: 10 MMOL/L (ref 5–15)
AST SERPL-CCNC: 25 U/L (ref 1–32)
BILIRUB SERPL-MCNC: 0.5 MG/DL (ref 0–1.2)
BILIRUB UR QL STRIP: NEGATIVE
BUN SERPL-MCNC: 10 MG/DL (ref 6–20)
BUN/CREAT SERPL: 14.3 (ref 7–25)
CALCIUM SPEC-SCNC: 8.9 MG/DL (ref 8.6–10.5)
CHLORIDE SERPL-SCNC: 107 MMOL/L (ref 98–107)
CHOLEST SERPL-MCNC: 197 MG/DL (ref 0–200)
CLARITY UR: CLEAR
CO2 SERPL-SCNC: 23 MMOL/L (ref 22–29)
COLOR UR: YELLOW
CREAT SERPL-MCNC: 0.7 MG/DL (ref 0.57–1)
DEPRECATED RDW RBC AUTO: 43.9 FL (ref 37–54)
EGFRCR SERPLBLD CKD-EPI 2021: 119.5 ML/MIN/1.73
ERYTHROCYTE [DISTWIDTH] IN BLOOD BY AUTOMATED COUNT: 13.7 % (ref 12.3–15.4)
FOLATE SERPL-MCNC: 15 NG/ML (ref 4.78–24.2)
GLOBULIN UR ELPH-MCNC: 3.2 GM/DL
GLUCOSE SERPL-MCNC: 73 MG/DL (ref 65–99)
GLUCOSE UR STRIP-MCNC: NEGATIVE MG/DL
HBA1C MFR BLD: 5.3 % (ref 4.8–5.6)
HCT VFR BLD AUTO: 37.7 % (ref 34–46.6)
HCV AB SER QL: NORMAL
HDLC SERPL-MCNC: 51 MG/DL (ref 40–60)
HGB BLD-MCNC: 12.2 G/DL (ref 12–15.9)
HGB UR QL STRIP.AUTO: NEGATIVE
HOLD SPECIMEN: NORMAL
IRON 24H UR-MRATE: 74 MCG/DL (ref 37–145)
KETONES UR QL STRIP: NEGATIVE
LDLC SERPL CALC-MCNC: 135 MG/DL (ref 0–100)
LDLC/HDLC SERPL: 2.62 {RATIO}
LEUKOCYTE ESTERASE UR QL STRIP.AUTO: NEGATIVE
MCH RBC QN AUTO: 28.6 PG (ref 26.6–33)
MCHC RBC AUTO-ENTMCNC: 32.4 G/DL (ref 31.5–35.7)
MCV RBC AUTO: 88.5 FL (ref 79–97)
NITRITE UR QL STRIP: NEGATIVE
PH UR STRIP.AUTO: 7 [PH] (ref 5–8)
PLATELET # BLD AUTO: 252 10*3/MM3 (ref 140–450)
PMV BLD AUTO: 11.9 FL (ref 6–12)
POTASSIUM SERPL-SCNC: 4 MMOL/L (ref 3.5–5.2)
PROT SERPL-MCNC: 6.7 G/DL (ref 6–8.5)
PROT UR QL STRIP: NEGATIVE
RBC # BLD AUTO: 4.26 10*6/MM3 (ref 3.77–5.28)
SODIUM SERPL-SCNC: 140 MMOL/L (ref 136–145)
SP GR UR STRIP: 1.01 (ref 1–1.03)
TRIGL SERPL-MCNC: 62 MG/DL (ref 0–150)
TSH SERPL DL<=0.05 MIU/L-ACNC: 2.56 UIU/ML (ref 0.27–4.2)
UROBILINOGEN UR QL STRIP: NORMAL
VIT B12 BLD-MCNC: 743 PG/ML (ref 211–946)
VLDLC SERPL-MCNC: 11 MG/DL (ref 5–40)
WBC NRBC COR # BLD AUTO: 7.57 10*3/MM3 (ref 3.4–10.8)

## 2025-08-01 RX ORDER — ERGOCALCIFEROL 1.25 MG/1
50000 CAPSULE, LIQUID FILLED ORAL WEEKLY
Qty: 12 CAPSULE | Refills: 1 | Status: SHIPPED | OUTPATIENT
Start: 2025-08-01

## 2025-08-01 NOTE — TELEPHONE ENCOUNTER
Pt returned call. Gave message from provider as to LDL and Vitamin D labs. Pt voiced understanding and appreciation.

## 2025-08-01 NOTE — PROGRESS NOTES
A My-chart message has been sent to the patient for Patient Rounding with [facility] [place].